# Patient Record
Sex: MALE | Race: WHITE | Employment: OTHER | URBAN - NONMETROPOLITAN AREA
[De-identification: names, ages, dates, MRNs, and addresses within clinical notes are randomized per-mention and may not be internally consistent; named-entity substitution may affect disease eponyms.]

---

## 2019-08-06 ENCOUNTER — ANESTHESIA EVENT (OUTPATIENT)
Dept: ENDOSCOPY | Age: 64
End: 2019-08-06
Payer: MEDICARE

## 2019-08-06 ENCOUNTER — ANESTHESIA (OUTPATIENT)
Dept: ENDOSCOPY | Age: 64
End: 2019-08-06
Payer: MEDICARE

## 2019-08-06 ENCOUNTER — HOSPITAL ENCOUNTER (OUTPATIENT)
Age: 64
Setting detail: OUTPATIENT SURGERY
Discharge: HOME | End: 2019-08-06
Attending: INTERNAL MEDICINE | Admitting: INTERNAL MEDICINE
Payer: MEDICARE

## 2019-08-06 VITALS
OXYGEN SATURATION: 100 % | WEIGHT: 171.6 LBS | BODY MASS INDEX: 23.24 KG/M2 | RESPIRATION RATE: 18 BRPM | TEMPERATURE: 97.6 F | HEIGHT: 72 IN | DIASTOLIC BLOOD PRESSURE: 73 MMHG | SYSTOLIC BLOOD PRESSURE: 133 MMHG | HEART RATE: 78 BPM

## 2019-08-06 VITALS
DIASTOLIC BLOOD PRESSURE: 56 MMHG | OXYGEN SATURATION: 99 % | SYSTOLIC BLOOD PRESSURE: 97 MMHG | RESPIRATION RATE: 9 BRPM

## 2019-08-06 PROCEDURE — 2580000003 HC RX 258: Performed by: INTERNAL MEDICINE

## 2019-08-06 PROCEDURE — 7100000000 HC PACU RECOVERY - FIRST 15 MIN: Performed by: INTERNAL MEDICINE

## 2019-08-06 PROCEDURE — 7100000001 HC PACU RECOVERY - ADDTL 15 MIN: Performed by: INTERNAL MEDICINE

## 2019-08-06 PROCEDURE — 3700000001 HC ADD 15 MINUTES (ANESTHESIA): Performed by: INTERNAL MEDICINE

## 2019-08-06 PROCEDURE — 2709999900 HC NON-CHARGEABLE SUPPLY: Performed by: INTERNAL MEDICINE

## 2019-08-06 PROCEDURE — 3700000000 HC ANESTHESIA ATTENDED CARE: Performed by: INTERNAL MEDICINE

## 2019-08-06 PROCEDURE — 3609009500 HC COLONOSCOPY DIAGNOSTIC OR SCREENING: Performed by: INTERNAL MEDICINE

## 2019-08-06 PROCEDURE — 6360000002 HC RX W HCPCS: Performed by: REGISTERED NURSE

## 2019-08-06 RX ORDER — M-VIT,TX,IRON,MINS/CALC/FOLIC 27MG-0.4MG
1 TABLET ORAL DAILY
COMMUNITY

## 2019-08-06 RX ORDER — SODIUM CHLORIDE 450 MG/100ML
INJECTION, SOLUTION INTRAVENOUS CONTINUOUS
Status: DISCONTINUED | OUTPATIENT
Start: 2019-08-06 | End: 2019-08-06 | Stop reason: HOSPADM

## 2019-08-06 RX ORDER — PIOGLITAZONE HCL AND METFORMIN HCL 850; 15 MG/1; MG/1
1 TABLET ORAL 2 TIMES DAILY WITH MEALS
COMMUNITY

## 2019-08-06 RX ORDER — CHLORAL HYDRATE 500 MG
CAPSULE ORAL
Status: ON HOLD | COMMUNITY
End: 2021-10-03

## 2019-08-06 RX ORDER — PROPOFOL 10 MG/ML
INJECTION, EMULSION INTRAVENOUS PRN
Status: DISCONTINUED | OUTPATIENT
Start: 2019-08-06 | End: 2019-08-06 | Stop reason: SDUPTHER

## 2019-08-06 RX ORDER — FENTANYL CITRATE 50 UG/ML
INJECTION, SOLUTION INTRAMUSCULAR; INTRAVENOUS PRN
Status: DISCONTINUED | OUTPATIENT
Start: 2019-08-06 | End: 2019-08-06 | Stop reason: SDUPTHER

## 2019-08-06 RX ORDER — AMLODIPINE AND OLMESARTAN MEDOXOMIL 10; 20 MG/1; MG/1
1 TABLET ORAL DAILY
COMMUNITY

## 2019-08-06 RX ORDER — AMPICILLIN TRIHYDRATE 250 MG
2 CAPSULE ORAL DAILY
COMMUNITY

## 2019-08-06 RX ADMIN — PROPOFOL 350 MG: 10 INJECTION, EMULSION INTRAVENOUS at 09:32

## 2019-08-06 RX ADMIN — SODIUM CHLORIDE: 4.5 INJECTION, SOLUTION INTRAVENOUS at 08:44

## 2019-08-06 RX ADMIN — FENTANYL CITRATE 100 MCG: 50 INJECTION INTRAMUSCULAR; INTRAVENOUS at 09:32

## 2019-08-06 ASSESSMENT — PAIN SCALES - GENERAL
PAINLEVEL_OUTOF10: 0
PAINLEVEL_OUTOF10: 0

## 2019-08-06 ASSESSMENT — PAIN - FUNCTIONAL ASSESSMENT: PAIN_FUNCTIONAL_ASSESSMENT: 0-10

## 2019-08-06 NOTE — OP NOTE
[address] [place] [address] Endoscopy    CONSCIOUS SEDATION      Patient: Singh Anglin  : 1955  Acct#: [de-identified]    PLANNED PROCEDURE   []EGD  [x]Colonoscopy []Flex Sigmoid  []Other:  Indication: Pain control for GI procedure    Consent: I have discussed with the patient and/or the patient representative the indication, alternatives, and the possible risks and/or complications of the planned procedure and the anesthesia methods. The patient and/or patient representative appear to understand and agree to proceed. Pre-Sedation Documentation and Exam: I have personally completed a history, physical exam & review of systems for this patient (see notes). Airway Assessment: mac    Prior History of Anesthesia Complications: mac    ASA Classification: mac    Sedation/ Anesthesia Plan: mac      SEDATION  Planned agent:[x]Midazolam []Meperidine [x]Sublimaze []Morphine    Monitoring and Safety: The patient will be placed on a cardiac monitor and vital signs, pulse oximetry and level of consciousness will be continuously evaluated throughout the procedure. The patient will be closely monitored until recovery from the medications is complete and the patient has returned to baseline status. Respiratory therapy will be on standby during the procedure. I have reviewed with the patient and/or family the risks, benefits, and alternatives to the procedure.     Stephanie Drummond MD, CNSP  2019, 9:28 AM    Post-Sedation Vital Signs: Vital signs were reviewed and were stable after the procedure (see flow sheet for vitals)            Post-Sedation Exam: Lungs: clear           Complications: none     Stephanie Drummond MD, ANIVALP  2019,

## 2019-08-06 NOTE — BRIEF OP NOTE
Brief Postoperative Note  ______________________________________________________________    Patient: Katie Correia  YOB: 1955  MRN: [id number]  Date of Procedure: 8/6/2019    Pre-Op Diagnosis: TUBULAR  ADENOMA, POOR PREP    Post-Op Diagnosis: Same       Procedure(s):  COLONOSCOPY DIAGNOSTIC    Anesthesia: Anesthesia type not filed in the log.     Surgeon(s):  Vi Vivar MD    Assistant: yes    Estimated Blood Loss (mL): less than 50     Complications: None    Specimens:   * No specimens in log *    Implants:  * No implants in log *      Drains: * No LDAs found *    Findings: normal colon    Vi Vivar MD  Date: 8/6/2019  Time: 9:52 AM

## 2019-08-06 NOTE — ANESTHESIA POSTPROCEDURE EVALUATION
Department of Anesthesiology  Postprocedure Note    Patient: Oswald Fonseca  MRN: [id number]  YOB: 1955  Date of evaluation: 8/6/2019  Time:  9:54 AM     Procedure Summary     Date:  08/06/19 Room / Location:  [facility] [place] / [facility]    Anesthesia Start:  7220 Anesthesia Stop:  6270    Procedure:  COLONOSCOPY DIAGNOSTIC (Left ) Diagnosis:  (TUBULAR  ADENOMA, POOR PREP)    Surgeon:  Rekha Cm MD Responsible Provider:  Alcira Day MD    Anesthesia Type:  MAC ASA Status:  2          Anesthesia Type: No value filed. Layo Phase I: Layo Score: 10    Layo Phase II:      Last vitals: Reviewed and per EMR flowsheets.        Anesthesia Post Evaluation    Patient location during evaluation: PACU  Patient participation: complete - patient participated  Level of consciousness: awake and alert  Pain score: 0  Airway patency: patent  Nausea & Vomiting: no vomiting and no nausea  Complications: no  Cardiovascular status: blood pressure returned to baseline  Respiratory status: acceptable and room air  Hydration status: euvolemic

## 2019-08-06 NOTE — ANESTHESIA PRE PROCEDURE
Cardiovascular:  Exercise tolerance: good (>4 METS),   (+) hypertension: moderate,                   Neuro/Psych:   Negative Neuro/Psych ROS              GI/Hepatic/Renal: Neg GI/Hepatic/Renal ROS  (+) bowel prep,           Endo/Other:    (+) DiabetesType II DM, well controlled, , .                 Abdominal:           Vascular: negative vascular ROS. Anesthesia Plan      MAC     ASA 2             Anesthetic plan and risks discussed with patient. Plan discussed with CRNA and attending.                   DEBBIE Mcgregor - NEO   8/6/2019

## 2021-10-02 ENCOUNTER — APPOINTMENT (OUTPATIENT)
Dept: CT IMAGING | Age: 66
DRG: 069 | End: 2021-10-02
Payer: MEDICARE

## 2021-10-02 ENCOUNTER — HOSPITAL ENCOUNTER (INPATIENT)
Age: 66
LOS: 1 days | Discharge: HOME | DRG: 069 | End: 2021-10-04
Attending: PEDIATRICS | Admitting: PEDIATRICS
Payer: MEDICARE

## 2021-10-02 DIAGNOSIS — H53.2 DIPLOPIA: Primary | ICD-10-CM

## 2021-10-02 DIAGNOSIS — G45.9 TIA (TRANSIENT ISCHEMIC ATTACK): ICD-10-CM

## 2021-10-02 LAB
ANION GAP SERPL CALCULATED.3IONS-SCNC: 17 MEQ/L (ref 8–16)
BASOPHILS # BLD: 0.7 %
BASOPHILS ABSOLUTE: 0 THOU/MM3 (ref 0–0.1)
BUN BLDV-MCNC: 9 MG/DL (ref 7–22)
CALCIUM SERPL-MCNC: 9.2 MG/DL (ref 8.5–10.5)
CHLORIDE BLD-SCNC: 98 MEQ/L (ref 98–111)
CO2: 22 MEQ/L (ref 23–33)
CREAT SERPL-MCNC: 0.6 MG/DL (ref 0.4–1.2)
EOSINOPHIL # BLD: 0.9 %
EOSINOPHILS ABSOLUTE: 0.1 THOU/MM3 (ref 0–0.4)
ERYTHROCYTE [DISTWIDTH] IN BLOOD BY AUTOMATED COUNT: 13.3 % (ref 11.5–14.5)
ERYTHROCYTE [DISTWIDTH] IN BLOOD BY AUTOMATED COUNT: 47.7 FL (ref 35–45)
GFR SERPL CREATININE-BSD FRML MDRD: > 90 ML/MIN/1.73M2
GLUCOSE BLD-MCNC: 134 MG/DL (ref 70–108)
HCT VFR BLD CALC: 43.9 % (ref 42–52)
HEMOGLOBIN: 15.1 GM/DL (ref 14–18)
IMMATURE GRANS (ABS): 0.03 THOU/MM3 (ref 0–0.07)
IMMATURE GRANULOCYTES: 0.5 %
LYMPHOCYTES # BLD: 27.6 %
LYMPHOCYTES ABSOLUTE: 1.5 THOU/MM3 (ref 1–4.8)
MCH RBC QN AUTO: 33.2 PG (ref 26–33)
MCHC RBC AUTO-ENTMCNC: 34.4 GM/DL (ref 32.2–35.5)
MCV RBC AUTO: 96.5 FL (ref 80–94)
MONOCYTES # BLD: 7.2 %
MONOCYTES ABSOLUTE: 0.4 THOU/MM3 (ref 0.4–1.3)
NUCLEATED RED BLOOD CELLS: 0 /100 WBC
OSMOLALITY CALCULATION: 274.5 MOSMOL/KG (ref 275–300)
PLATELET # BLD: 197 THOU/MM3 (ref 130–400)
PMV BLD AUTO: 9.5 FL (ref 9.4–12.4)
POTASSIUM SERPL-SCNC: 3.9 MEQ/L (ref 3.5–5.2)
RBC # BLD: 4.55 MILL/MM3 (ref 4.7–6.1)
SEG NEUTROPHILS: 63.1 %
SEGMENTED NEUTROPHILS ABSOLUTE COUNT: 3.5 THOU/MM3 (ref 1.8–7.7)
SODIUM BLD-SCNC: 137 MEQ/L (ref 135–145)
WBC # BLD: 5.6 THOU/MM3 (ref 4.8–10.8)

## 2021-10-02 PROCEDURE — 6360000004 HC RX CONTRAST MEDICATION: Performed by: NURSE PRACTITIONER

## 2021-10-02 PROCEDURE — 85025 COMPLETE CBC W/AUTO DIFF WBC: CPT

## 2021-10-02 PROCEDURE — 2580000003 HC RX 258: Performed by: NURSE PRACTITIONER

## 2021-10-02 PROCEDURE — 96374 THER/PROPH/DIAG INJ IV PUSH: CPT

## 2021-10-02 PROCEDURE — 70496 CT ANGIOGRAPHY HEAD: CPT

## 2021-10-02 PROCEDURE — 96361 HYDRATE IV INFUSION ADD-ON: CPT

## 2021-10-02 PROCEDURE — 70450 CT HEAD/BRAIN W/O DYE: CPT

## 2021-10-02 PROCEDURE — 99284 EMERGENCY DEPT VISIT MOD MDM: CPT

## 2021-10-02 PROCEDURE — 6360000002 HC RX W HCPCS: Performed by: NURSE PRACTITIONER

## 2021-10-02 PROCEDURE — 70498 CT ANGIOGRAPHY NECK: CPT

## 2021-10-02 PROCEDURE — 80048 BASIC METABOLIC PNL TOTAL CA: CPT

## 2021-10-02 PROCEDURE — 36415 COLL VENOUS BLD VENIPUNCTURE: CPT

## 2021-10-02 RX ORDER — ONDANSETRON 2 MG/ML
4 INJECTION INTRAMUSCULAR; INTRAVENOUS ONCE
Status: COMPLETED | OUTPATIENT
Start: 2021-10-02 | End: 2021-10-02

## 2021-10-02 RX ORDER — 0.9 % SODIUM CHLORIDE 0.9 %
1000 INTRAVENOUS SOLUTION INTRAVENOUS ONCE
Status: COMPLETED | OUTPATIENT
Start: 2021-10-02 | End: 2021-10-02

## 2021-10-02 RX ADMIN — SODIUM CHLORIDE 1000 ML: 9 INJECTION, SOLUTION INTRAVENOUS at 20:59

## 2021-10-02 RX ADMIN — ONDANSETRON 4 MG: 2 INJECTION INTRAMUSCULAR; INTRAVENOUS at 20:58

## 2021-10-02 RX ADMIN — IOPAMIDOL 80 ML: 755 INJECTION, SOLUTION INTRAVENOUS at 23:50

## 2021-10-02 ASSESSMENT — ENCOUNTER SYMPTOMS
COLOR CHANGE: 0
DIARRHEA: 0
ABDOMINAL PAIN: 0
ABDOMINAL DISTENTION: 0
SHORTNESS OF BREATH: 0
NAUSEA: 1
CHEST TIGHTNESS: 0
EYE PAIN: 0
VOMITING: 1
PHOTOPHOBIA: 0
EYE REDNESS: 0

## 2021-10-02 ASSESSMENT — VISUAL ACUITY
OD: 20/25
OU: 20/70
OS: 20/30

## 2021-10-02 NOTE — Clinical Note
Patient Class: Observation [104]   REQUIRED: Diagnosis: Acute CVA (cerebrovascular accident) [facility]) [[id number]]   Estimated Length of Stay: Estimated stay of less than 2 midnights   Admitting Provider: Frieda Kaiser [[id number]]

## 2021-10-03 ENCOUNTER — APPOINTMENT (OUTPATIENT)
Dept: MRI IMAGING | Age: 66
DRG: 069 | End: 2021-10-03
Payer: MEDICARE

## 2021-10-03 PROBLEM — I63.9 ACUTE CVA (CEREBROVASCULAR ACCIDENT) (HCC): Status: ACTIVE | Noted: 2021-10-03

## 2021-10-03 PROBLEM — G45.9 TIA (TRANSIENT ISCHEMIC ATTACK): Status: ACTIVE | Noted: 2021-10-03

## 2021-10-03 LAB
AVERAGE GLUCOSE: 126 MG/DL (ref 70–126)
CHOLESTEROL, TOTAL: 151 MG/DL (ref 100–199)
GLUCOSE BLD-MCNC: 114 MG/DL (ref 70–108)
GLUCOSE BLD-MCNC: 119 MG/DL (ref 70–108)
GLUCOSE BLD-MCNC: 123 MG/DL (ref 70–108)
GLUCOSE BLD-MCNC: 153 MG/DL (ref 70–108)
HBA1C MFR BLD: 6.2 % (ref 4.4–6.4)
HDLC SERPL-MCNC: 60 MG/DL
LDL CHOLESTEROL CALCULATED: 71 MG/DL
MRSA SCREEN RT-PCR: NEGATIVE
TRIGL SERPL-MCNC: 100 MG/DL (ref 0–199)
VANCOMYCIN RESISTANT ENTEROCOCCUS: NEGATIVE

## 2021-10-03 PROCEDURE — 36415 COLL VENOUS BLD VENIPUNCTURE: CPT

## 2021-10-03 PROCEDURE — 6360000004 HC RX CONTRAST MEDICATION: Performed by: PEDIATRICS

## 2021-10-03 PROCEDURE — 99223 1ST HOSP IP/OBS HIGH 75: CPT | Performed by: PEDIATRICS

## 2021-10-03 PROCEDURE — 1200000000 HC SEMI PRIVATE

## 2021-10-03 PROCEDURE — 80061 LIPID PANEL: CPT

## 2021-10-03 PROCEDURE — 6370000000 HC RX 637 (ALT 250 FOR IP): Performed by: PEDIATRICS

## 2021-10-03 PROCEDURE — 87641 MR-STAPH DNA AMP PROBE: CPT

## 2021-10-03 PROCEDURE — G0378 HOSPITAL OBSERVATION PER HR: HCPCS

## 2021-10-03 PROCEDURE — 82948 REAGENT STRIP/BLOOD GLUCOSE: CPT

## 2021-10-03 PROCEDURE — 2580000003 HC RX 258: Performed by: PEDIATRICS

## 2021-10-03 PROCEDURE — A9579 GAD-BASE MR CONTRAST NOS,1ML: HCPCS | Performed by: PEDIATRICS

## 2021-10-03 PROCEDURE — 6360000002 HC RX W HCPCS: Performed by: PEDIATRICS

## 2021-10-03 PROCEDURE — 87081 CULTURE SCREEN ONLY: CPT

## 2021-10-03 PROCEDURE — 70553 MRI BRAIN STEM W/O & W/DYE: CPT

## 2021-10-03 PROCEDURE — 96372 THER/PROPH/DIAG INJ SC/IM: CPT

## 2021-10-03 PROCEDURE — 83036 HEMOGLOBIN GLYCOSYLATED A1C: CPT

## 2021-10-03 PROCEDURE — 99223 1ST HOSP IP/OBS HIGH 75: CPT | Performed by: PHYSICIAN ASSISTANT

## 2021-10-03 PROCEDURE — 87500 VANOMYCIN DNA AMP PROBE: CPT

## 2021-10-03 RX ORDER — SODIUM CHLORIDE 0.9 % (FLUSH) 0.9 %
10 SYRINGE (ML) INJECTION EVERY 12 HOURS SCHEDULED
Status: DISCONTINUED | OUTPATIENT
Start: 2021-10-03 | End: 2021-10-04 | Stop reason: HOSPADM

## 2021-10-03 RX ORDER — SODIUM CHLORIDE 0.9 % (FLUSH) 0.9 %
10 SYRINGE (ML) INJECTION PRN
Status: DISCONTINUED | OUTPATIENT
Start: 2021-10-03 | End: 2021-10-04 | Stop reason: HOSPADM

## 2021-10-03 RX ORDER — DEXTROSE MONOHYDRATE 25 G/50ML
12.5 INJECTION, SOLUTION INTRAVENOUS PRN
Status: DISCONTINUED | OUTPATIENT
Start: 2021-10-03 | End: 2021-10-04 | Stop reason: HOSPADM

## 2021-10-03 RX ORDER — ONDANSETRON 4 MG/1
4 TABLET, ORALLY DISINTEGRATING ORAL EVERY 6 HOURS PRN
COMMUNITY

## 2021-10-03 RX ORDER — SODIUM CHLORIDE 9 MG/ML
25 INJECTION, SOLUTION INTRAVENOUS PRN
Status: DISCONTINUED | OUTPATIENT
Start: 2021-10-03 | End: 2021-10-04 | Stop reason: HOSPADM

## 2021-10-03 RX ORDER — ACETAMINOPHEN 325 MG/1
650 TABLET ORAL EVERY 6 HOURS PRN
Status: DISCONTINUED | OUTPATIENT
Start: 2021-10-03 | End: 2021-10-04 | Stop reason: HOSPADM

## 2021-10-03 RX ORDER — DEXTROSE MONOHYDRATE 50 MG/ML
100 INJECTION, SOLUTION INTRAVENOUS PRN
Status: DISCONTINUED | OUTPATIENT
Start: 2021-10-03 | End: 2021-10-04 | Stop reason: HOSPADM

## 2021-10-03 RX ORDER — SODIUM CHLORIDE 9 MG/ML
INJECTION, SOLUTION INTRAVENOUS CONTINUOUS
Status: DISCONTINUED | OUTPATIENT
Start: 2021-10-03 | End: 2021-10-04

## 2021-10-03 RX ORDER — ONDANSETRON 2 MG/ML
4 INJECTION INTRAMUSCULAR; INTRAVENOUS EVERY 6 HOURS PRN
Status: DISCONTINUED | OUTPATIENT
Start: 2021-10-03 | End: 2021-10-04 | Stop reason: HOSPADM

## 2021-10-03 RX ORDER — MAGNESIUM HYDROXIDE/ALUMINUM HYDROXICE/SIMETHICONE 120; 1200; 1200 MG/30ML; MG/30ML; MG/30ML
30 SUSPENSION ORAL EVERY 6 HOURS PRN
Status: DISCONTINUED | OUTPATIENT
Start: 2021-10-03 | End: 2021-10-04 | Stop reason: HOSPADM

## 2021-10-03 RX ORDER — ONDANSETRON 4 MG/1
4 TABLET, ORALLY DISINTEGRATING ORAL EVERY 8 HOURS PRN
Status: DISCONTINUED | OUTPATIENT
Start: 2021-10-03 | End: 2021-10-04 | Stop reason: HOSPADM

## 2021-10-03 RX ORDER — ATORVASTATIN CALCIUM 80 MG/1
80 TABLET, FILM COATED ORAL NIGHTLY
Status: DISCONTINUED | OUTPATIENT
Start: 2021-10-03 | End: 2021-10-04 | Stop reason: HOSPADM

## 2021-10-03 RX ORDER — POLYETHYLENE GLYCOL 3350 17 G/17G
17 POWDER, FOR SOLUTION ORAL DAILY PRN
Status: DISCONTINUED | OUTPATIENT
Start: 2021-10-03 | End: 2021-10-04 | Stop reason: HOSPADM

## 2021-10-03 RX ORDER — NICOTINE POLACRILEX 4 MG
15 LOZENGE BUCCAL PRN
Status: DISCONTINUED | OUTPATIENT
Start: 2021-10-03 | End: 2021-10-04 | Stop reason: HOSPADM

## 2021-10-03 RX ORDER — AMLODIPINE AND OLMESARTAN MEDOXOMIL 10; 20 MG/1; MG/1
1 TABLET ORAL DAILY
Status: DISCONTINUED | OUTPATIENT
Start: 2021-10-03 | End: 2021-10-03

## 2021-10-03 RX ORDER — ACETAMINOPHEN 650 MG/1
650 SUPPOSITORY RECTAL EVERY 6 HOURS PRN
Status: DISCONTINUED | OUTPATIENT
Start: 2021-10-03 | End: 2021-10-04 | Stop reason: HOSPADM

## 2021-10-03 RX ORDER — LANOLIN ALCOHOL/MO/W.PET/CERES
500 CREAM (GRAM) TOPICAL DAILY
Status: DISCONTINUED | OUTPATIENT
Start: 2021-10-03 | End: 2021-10-04 | Stop reason: HOSPADM

## 2021-10-03 RX ORDER — CHOLECALCIFEROL (VITAMIN D3) 50 MCG
1 TABLET ORAL DAILY
COMMUNITY

## 2021-10-03 RX ORDER — AMLODIPINE BESYLATE 10 MG/1
10 TABLET ORAL DAILY
Status: DISCONTINUED | OUTPATIENT
Start: 2021-10-03 | End: 2021-10-04 | Stop reason: HOSPADM

## 2021-10-03 RX ORDER — LOSARTAN POTASSIUM 50 MG/1
50 TABLET ORAL DAILY
Status: DISCONTINUED | OUTPATIENT
Start: 2021-10-03 | End: 2021-10-04 | Stop reason: HOSPADM

## 2021-10-03 RX ADMIN — INSULIN LISPRO 1 UNITS: 100 INJECTION, SOLUTION INTRAVENOUS; SUBCUTANEOUS at 18:15

## 2021-10-03 RX ADMIN — ASPIRIN 325 MG: 325 TABLET, COATED ORAL at 12:00

## 2021-10-03 RX ADMIN — SODIUM CHLORIDE: 9 INJECTION, SOLUTION INTRAVENOUS at 05:44

## 2021-10-03 RX ADMIN — AMLODIPINE BESYLATE 10 MG: 10 TABLET ORAL at 12:00

## 2021-10-03 RX ADMIN — SODIUM CHLORIDE, PRESERVATIVE FREE 10 ML: 5 INJECTION INTRAVENOUS at 13:11

## 2021-10-03 RX ADMIN — GADOTERIDOL 15 ML: 279.3 INJECTION, SOLUTION INTRAVENOUS at 11:27

## 2021-10-03 RX ADMIN — ENOXAPARIN SODIUM 40 MG: 40 INJECTION SUBCUTANEOUS at 13:10

## 2021-10-03 ASSESSMENT — PAIN SCALES - GENERAL
PAINLEVEL_OUTOF10: 0
PAINLEVEL_OUTOF10: 0

## 2021-10-03 NOTE — PROGRESS NOTES
Patient admitted into 4K01. Patient A&Ox4. Continues to have complaints of left eye double vision. Denies pain. Patient oriented to room. 2 person skin assessment completed with Puja Vickers RN. Abrasion  Noted to left elbow. Pt has dry skin on right side of face.

## 2021-10-03 NOTE — ED NOTES
Pt appears to be resting on cot with eyes closed. No distress noted. Respirations even and unlabored. Call light is in place. Will monitor.       Saige Vaughn RN  10/03/21 0974

## 2021-10-03 NOTE — ED NOTES
PT reports nausea better. Denies needs at this time.   No s/sx of distress     Francisco Bunn RN  10/02/21 3673

## 2021-10-03 NOTE — ED NOTES
Pt transported to [facility] cart in stable condition. Floor contacted before transport. Spoke with Dave Holbrook.      Meri Mouse  10/03/21 4945

## 2021-10-03 NOTE — ED NOTES
Bed: 038A  Expected date:   Expected time:   Means of arrival:   Comments:  HEENA Wesley RN  10/03/21 1047

## 2021-10-03 NOTE — ED NOTES
Pt back on cot. Pt denies pain at this time. Respirations even and unlabored. Call light in place.  Pt denies any need or concerns at this time     Saige Vaughn RN  10/03/21 5970

## 2021-10-03 NOTE — ED TRIAGE NOTES
PT states for 1.5 days I've been having blurred & double vision in both eyes. Denies headache. PT ambulated from triage to ER room.

## 2021-10-03 NOTE — ED NOTES
Pt to MRI at this time.  Will be returning to room 38 after MRI     Francisco Barron RN  10/03/21 1046

## 2021-10-03 NOTE — ED NOTES
ED nurse-to-nurse bedside report    Chief Complaint   Patient presents with    Blurred Vision      LOC: alert and orientated to name, place, date  Vital signs   Vitals:    10/02/21 2033 10/02/21 2202   BP: (!) 156/81 133/71   Pulse: 95 69   Resp: 19 18   Temp: 98.1 °F (36.7 °C)    TempSrc: Oral    SpO2: 99% 96%      Pain:    Pain Interventions: none  Pain Goal: none  Oxygen: No    Current needs required none   Telemetry: No  LDAs:   Peripheral IV 10/02/21 Right Hand (Active)   Site Assessment Clean;Dry; Intact 10/02/21 2048   Line Status Blood return noted;Capped;Flushed 10/02/21 2048   Dressing Status Clean;Dry; Intact 10/02/21 2048   Dressing Intervention New 10/02/21 2048     Continuous Infusions:   Mobility: Requires assistance * 1  Savage Fall Risk Score: No flowsheet data found.   Fall Interventions: none  Report given to: 30 Naseem Wheeler RN  10/02/21 7113

## 2021-10-03 NOTE — ED NOTES
Pt up to restroom. Pt states he is still experiencing \"some\" burry vision, but it is \"better\" than before. Pt able to walk to restroom without any complications.       Jason Vu RN  10/03/21 0795

## 2021-10-03 NOTE — ED NOTES
ED to inpatient nurses report    Chief Complaint   Patient presents with    Blurred Vision      Present to ED from home  LOC: alert and orientated to name, place, date  Vital signs   Vitals:    10/03/21 0917 10/03/21 1032 10/03/21 1257 10/03/21 1456   BP: (!) 144/86 138/79 (!) 138/46 136/76   Pulse: 77 86 82 78   Resp: 16 16 16 16   Temp:       TempSrc:       SpO2: 96% 96% 97% 96%   Weight:       Height:          Oxygen Baseline Room Air    Current needs required Room Air  LDAs:   Peripheral IV 10/02/21 Right Hand (Active)   Site Assessment Clean;Dry; Intact 10/03/21 1033   Line Status Flushed 10/03/21 1033   Dressing Status Clean;Dry; Intact 10/03/21 1033   Dressing Intervention New 10/02/21 2048       Peripheral IV 10/02/21 Right Upper arm (Active)   Site Assessment Clean;Dry; Intact 10/03/21 1033   Line Status Infusing 10/03/21 1033   Dressing Status Clean;Dry; Intact 10/03/21 1033   Dressing Intervention New 10/02/21 2336     Mobility: Independent  Pending ED orders: None  Present condition: Stable    Electronically signed by Omero Candelario RN on 10/3/2021 at 3:52 PM       Omero Candelario RN  10/03/21 1318

## 2021-10-03 NOTE — ED PROVIDER NOTES
[facility] Emergency Department    CHIEF COMPLAINT       Chief Complaint   Patient presents with    Blurred Vision       Nurses Notes reviewed and I agree except as noted in the HPI. HISTORY OF PRESENT ILLNESS    Agustin Sharma is a 77 y.o. male who presents to the ED for evaluation of double vision. Patient notes yesterday around noon he was welding some galvanized metal, afterwards he developed severe nausea and vomiting. He then noticed that he is having double vision, he notes it has only with both eyes open. Its worse when looking at things at a distance or to his left hand side. He notes unsteadiness in gait. He has noted continued nausea and vomiting throughout the night and today. He has a history of diabetes and hypertension, he notes no significant elevation in blood sugars. He denies any similar symptoms in the past other than nausea in the past.      HPI was provided by the patient. REVIEW OF SYSTEMS     Review of Systems   Constitutional: Negative for activity change, chills and fever. Eyes: Positive for visual disturbance. Negative for photophobia, pain and redness. Respiratory: Negative for chest tightness and shortness of breath. Cardiovascular: Negative for chest pain. Gastrointestinal: Positive for nausea and vomiting. Negative for abdominal distention, abdominal pain and diarrhea. Genitourinary: Negative for decreased urine volume and difficulty urinating. Musculoskeletal: Positive for gait problem. Negative for neck pain and neck stiffness. Skin: Negative for color change. Neurological: Negative for dizziness, facial asymmetry, speech difficulty, weakness, light-headedness, numbness and headaches. Psychiatric/Behavioral: Negative for agitation and behavioral problems.         PAST MEDICAL HISTORY     Past Medical History:   Diagnosis Date    Diabetes mellitus ([address]     History of blood transfusion     Hypertension        SURGICALHISTORY      has a past surgical history that includes hernia repair; Foot surgery (Left); and Colonoscopy (Left, 8/6/2019). CURRENT MEDICATIONS       Previous Medications    AMLODIPINE-OLMESARTAN (DANIEL) 10-20 MG PER TABLET    Take 1 tablet by mouth daily    ASPIRIN 81 MG TABLET    Take 81 mg by mouth daily    CINNAMON 500 MG CAPS    Take by mouth    CYANOCOBALAMIN (VITAMIN B 12 PO)    Take 500 mg by mouth daily    MULTIPLE VITAMINS-MINERALS (THERAPEUTIC MULTIVITAMIN-MINERALS) TABLET    Take 1 tablet by mouth daily    OMEGA-3 FATTY ACIDS (OMEGA-3 FISH OIL) 1000 MG CAPS    Take by mouth    PIOGLITAZONE-METFORMIN (ACTOPLUS MET)  MG PER TABLET    Take 1 tablet by mouth 2 times daily (with meals)       ALLERGIES     has No Known Allergies. FAMILY HISTORY     has no family status information on file. family history is not on file. SOCIAL HISTORY       Social History     Socioeconomic History    Marital status:      Spouse name: Not on file    Number of children: Not on file    Years of education: Not on file    Highest education level: Not on file   Occupational History    Not on file   Tobacco Use    Smoking status: Never Smoker    Smokeless tobacco: Never Used   Substance and Sexual Activity    Alcohol use: Yes     Comment: daily 2 cans    Drug use: Never    Sexual activity: Not on file   Other Topics Concern    Not on file   Social History Narrative    Not on file     Social Determinants of Health     Financial Resource Strain:     Difficulty of Paying Living Expenses:    Food Insecurity:     Worried About Running Out of Food in the Last Year:     [address] in the Last Year:    Transportation Needs:     Lack of Transportation (Medical):      Lack of Transportation (Non-Medical):    Physical Activity:     Days of Exercise per Week:     Minutes of Exercise per Session:    Stress:     Feeling of Stress :    Social Connections:     Frequency of Communication with Friends and Family:     Frequency of Social Gatherings with Friends and Family:     Attends Islam Services:     Active Member of Clubs or Organizations:     Attends Club or Organization Meetings:     Marital Status:    Intimate Partner Violence:     Fear of Current or Ex-Partner:     Emotionally Abused:     Physically Abused:     Sexually Abused:        PHYSICAL EXAM     INITIAL VITALS:  oral temperature is 98.1 °F (36.7 °C). His blood pressure is 122/72 and his pulse is 69. His respiration is 18 and oxygen saturation is 94%. Physical Exam  Vitals and nursing note reviewed. Constitutional:       General: He is not in acute distress. Appearance: Normal appearance. He is normal weight. He is not toxic-appearing. HENT:      Head: Normocephalic and atraumatic. Nose: Nose normal.      Mouth/Throat:      Mouth: Mucous membranes are moist.   Eyes:      General: Lids are normal. No visual field deficit. Extraocular Movements: Extraocular movements intact. Right eye: Normal extraocular motion and no nystagmus. Left eye: Normal extraocular motion and no nystagmus. Conjunctiva/sclera: Conjunctivae normal.      Right eye: Right conjunctiva is not injected. Left eye: Left conjunctiva is not injected. Pupils: Pupils are equal, round, and reactive to light. Visual Fields: Right eye visual fields normal and left eye visual fields normal.   Cardiovascular:      Rate and Rhythm: Normal rate. Pulses: Normal pulses. Pulmonary:      Effort: Pulmonary effort is normal.   Abdominal:      General: Abdomen is flat. Palpations: Abdomen is soft. Musculoskeletal:         General: Normal range of motion. Cervical back: Normal range of motion. Skin:     General: Skin is warm. Capillary Refill: Capillary refill takes less than 2 seconds. Neurological:      Mental Status: He is alert and oriented to person, place, and time. GCS: GCS eye subscore is 4. GCS verbal subscore is 5. dose modulation techniques and iterative    reconstructions, and/or weight-based dosing   when appropriate to reduce radiation to a low as reasonably achievable. Carotid stenosis and measurements are in accordance with NASCET criteria. LABS:   Labs Reviewed   CBC WITH AUTO DIFFERENTIAL - Abnormal; Notable for the following components:       Result Value    RBC 4.55 (*)     MCV 96.5 (*)     MCH 33.2 (*)     RDW-SD 47.7 (*)     All other components within normal limits   BASIC METABOLIC PANEL - Abnormal; Notable for the following components:    CO2 22 (*)     Glucose 134 (*)     All other components within normal limits   ANION GAP - Abnormal; Notable for the following components:    Anion Gap 17.0 (*)     All other components within normal limits   OSMOLALITY - Abnormal; Notable for the following components:    Osmolality Calc 274.5 (*)     All other components within normal limits   GLOMERULAR FILTRATION RATE, ESTIMATED       EMERGENCY DEPARTMENT COURSE:   Vitals:    Vitals:    10/03/21 0032 10/03/21 0041 10/03/21 0140 10/03/21 0348   BP: (!) 144/74   122/72   Pulse: 67 73 74 69   Resp: 14 18 18 18   Temp:       TempSrc:       SpO2: 96% 95% 95% 94%       MDM    Patient was seen and evaluated in the emergency department, patient appeared to be in no acute distress, vital signs reviewed, slight hypertension noted. Physical exam was completed, no significant abnormalities noted. Cranial nerves are grossly intact, pupils are equal react to light, no abnormal to extraocular movements. Noted worsening double vision with leftward view. Discussed the case with on-call neurology, they suggested CT with CTA of the head neck. Labs and imaging were ordered. Results were reviewed, no significant findings noted. Patient was reassessed, continues to be having diplopia, notes improvement in nausea after medication. Discussed the case again with neurology, they suggest admission for MRI.   I discussed this

## 2021-10-03 NOTE — CONSULTS
[facility] Consult Note          Name: Evelia Jeronimo  MRN: [id number]  : 1955  Date: 10/3/2021    Requesting Physician: Jenny Shearer MD     Reason for Consult:  Evaluate for diplopia     Chief Complaint: double vision, balance trouble     History of Present Illness: This is a 77, M, w/ PMHx significant for HTN, DM, who reports acute onset double vision & balance issues w/ associated nausea that first appeared 2 days ago. The patient describes his double vision as images stacked on top of one another, experiencing resolution w/ eye closure. Concerning the patient's balance issues, he says that his symptoms, which are characterized as being pulled to both the left and right, are exacerbated by head turning, improved w/ laying down. The patient denies any preceding illness or relevant trigger. There has been no associated speech disturbance, focal weakness, or numbness. SHx review shows that he's a never smoker. ETOH w/ 2 beers nightly. No illicit drug use. No new meds. FHx positive for mother w/ MS diagnosis. Patient reports improvement of his symptoms throughout the ER course w/ the administration of fluids & Zofran     Past Medical History:        Diagnosis Date    Diabetes mellitus ([facility] [address]     History of blood transfusion     Hypertension            Procedure Laterality Date    COLONOSCOPY Left 2019    COLONOSCOPY DIAGNOSTIC performed by Mone Johnosn [facility] at [facility] Left     HERNIA REPAIR         Social History:  Social History     Tobacco Use   Smoking Status Never Smoker   Smokeless Tobacco Never Used     Social History     Substance and Sexual Activity   Alcohol Use Yes    Comment: daily 2 cans     Social History     Substance and Sexual Activity   Drug Use Never         Family History:   History reviewed. No pertinent family history. Review of Systems:  Neuro: Positive for diplopia when gaze to the L.  Negative for vision loss, weakness, sensation loss  Cardio: Denies chest pain, palpitations  Lungs: Denies cough, dyspnea    GI: Positive for nausea, denies vomiting    - All other systems reviewed & are negative unless mentioned in HPI      Allergies:    No Known Allergies     Current Medications:   vitamin B-12 (CYANOCOBALAMIN) tablet 500 mcg, Daily  sodium chloride flush 0.9 % injection 10 mL, 2 times per day  sodium chloride flush 0.9 % injection 10 mL, PRN  0.9 % sodium chloride infusion, PRN  enoxaparin (LOVENOX) injection 40 mg, Daily  ondansetron (ZOFRAN-ODT) disintegrating tablet 4 mg, Q8H PRN   Or  ondansetron (ZOFRAN) injection 4 mg, Q6H PRN  polyethylene glycol (GLYCOLAX) packet 17 g, Daily PRN  acetaminophen (TYLENOL) tablet 650 mg, Q6H PRN   Or  acetaminophen (TYLENOL) suppository 650 mg, Q6H PRN  aluminum & magnesium hydroxide-simethicone (MAALOX) 200-200-20 MG/5ML suspension 30 mL, Q6H PRN  glucose (GLUTOSE) 40 % oral gel 15 g, PRN  dextrose 50 % IV solution, PRN  glucagon (rDNA) injection 1 mg, PRN  dextrose 5 % solution, PRN  insulin lispro (HUMALOG) injection vial 0-6 Units, TID WC  insulin lispro (HUMALOG) injection vial 0-3 Units, Nightly  perflutren lipid microspheres (DEFINITY) injection 1.65 mg, ONCE PRN  0.9 % sodium chloride infusion, Continuous  aspirin EC tablet 325 mg, Daily  amLODIPine (NORVASC) tablet 10 mg, Daily   Or  losartan (COZAAR) tablet 50 mg, Daily         Physical Exam:  /71   Pulse 65   Temp 98.1 °F (36.7 °C) (Oral)   Resp 18   Ht 6' (1.829 m)   Wt 178 lb (80.7 kg)   SpO2 95%   BMI 24.14 kg/m²  I Body mass index is 24.14 kg/m².  I   Wt Readings from Last 1 Encounters:   10/03/21 178 lb (80.7 kg)          Cognition:   Level of Alertness: awake, alert   Orientation: a/o x3 (person, place, time)  Fund of Knowledge: appears intact  Attention/Concentration: normal  Cranial Nerves: cranial nerves II-XII are grossly intact  Language: Repetition, reading, naming intact  Dysarthria: None  Commands: patient able to follow commands crossing the midline      Motor Exam:  RUE: 5/5   RLE: 5/5   LUE: 5/5   LLE: 5/5    Muscle Bulk: no muscle waisting, bulk appropriate for age  Muscle Tone: normal    Abnormal movements: none    DTR:   BL brachioradialis: 2+   BL patellar: 2+    Sensory: Sensory intact with light touch throughout   Coordination: FNF intact, HTS intact  Gait: deferred d/t patient status       General appearance: NAD, conversant  Eyes: anicteric sclerae, moist   HENT: Atraumatic; oropharynx clear with moist mucous membranes  Neck: Trachea midline; FROM  Lungs: symmetrical chest rise, no assesscory muscle use  CV: skin appears well perfused, 2+ pulses at the radial BL   Abdomen: Soft, non-tender  Extremities: no deformities, no swelling   Skin: Normal temperature, turgor  Psych: Appropriate affect, pleasant       Diagnostics:  CBC:   Lab Results   Component Value Date    WBC 5.6 10/02/2021    RBC 4.55 10/02/2021    RBC 4.40 09/25/2018    HGB 15.1 10/02/2021    HCT 43.9 10/02/2021    MCV 96.5 10/02/2021    MCH 33.2 10/02/2021    MCHC 34.4 10/02/2021    RDW 12.4 09/25/2018     10/02/2021    MPV 9.5 10/02/2021     CMP:    Lab Results   Component Value Date     10/02/2021    K 3.9 10/02/2021    CL 98 10/02/2021    CO2 22 10/02/2021    BUN 9 10/02/2021    CREATININE 0.6 10/02/2021    LABGLOM >90 10/02/2021    GLUCOSE 134 10/02/2021    GLUCOSE 155 03/29/2019    PROT 7.5 03/29/2019    LABALBU 4.5 03/29/2019    CALCIUM 9.2 10/02/2021    BILITOT 0.4 03/29/2019    ALKPHOS 72 03/29/2019    AST 17 03/29/2019    ALT 15 03/29/2019     Imaging:     CT HEAD WO CONTRAST 10/3/21  Impression   No acute intracranial findings. Nonemergent/incidental findings in the report.         CTA HEAD W WO CONTRAST 10/3/21   Impression   No intracranial large artery occlusion or hemodynamically significant    stenosis.      CTA NECK W WO CONTRAST  Impression   No occlusion in the carotid or vertebral arteries. No dissection. Moderate stenosis of the V1 segment of the left vertebral artery. MRI BRAIN W WO CONTRAST  Impression       1. No evidence of acute intracranial abnormality. 2. Mild severity chronic microvascular angiopathy. 3. Small chronic lacunar infarct in the right basal ganglia. Assessment & Recommendations:    1. Diplopia & balance disturbance in the setting of negative neuro images and known DM point to etiology other than stroke or vascular cause. Diplopia possibly related to 4th nerve palsy too subtle to detect on physical exam in the setting of DM.  Balance disturbance likely an acute peripheral vestibulopathy.    - CT & MR studies personally reviewed   - Continue w/ Zofran as needed   - Consider short-term use --> prolonged use may interfere w/ central vestibular compensation & delay recovery   - Maintain supportive therapy including fluids & rest  - Recommend vestibular rehabilitation therapy  - Neurology will sign-off, please call w/ questions      Discussed w/ Dr. Maria Luz Cagle PA-C  Interventional Neurology

## 2021-10-03 NOTE — ED NOTES
Pt appears to be resting comfortably on cot. MRI form completed. Pt denies pain. Wife at bedside.  Pt denies any needs or concerns at this time     Shannon Hernandez RN  10/03/21 7205

## 2021-10-03 NOTE — H&P
Hospitalist History and Physical          Patient: Becca Hinson  : 1955  MRN: [id number]     Acct: [de-identified]    PCP: Natalie Corado MD  Date of Admission: 10/2/2021  Date of Service: Pt seen/examined on 10/03/21  and Admitted to inpatient with expected LOS more than two midnights due to medical therapy. Hospital Problems         Last Modified POA    Acute CVA (cerebrovascular accident) ([address] 10/3/2021 Yes          Assessment and Plan:    1. Suspected acute CVA:  Persistent diplopia and imbalance concerning for a posterior circulation region. Admit to the hospital for further work-up with MRI and echocardiogram.  Full dose aspirin 325 mg daily. Permissive hypertension up to a systolic blood pressure of 175 mmHg. Hold Norvasc for now since patient's blood pressure normal now. Neurology consultation. Neurochecks every 4 hours. A1c and lipid profile for further risk stratification. Antiplatelet therapy, blood sugar and blood pressure control after discharge for risk modification and secondary prevention. PT/OT evaluation and treatment. Speech therapy evaluation and treatment. Keep on dysphagia diet for now. 2.  Diabetes mellitus type 2:  Last hemoglobin A1c on 3/2019 was 6.5. Repeat hemoglobin A1c. Accu-Cheks q. CHS and diabetic diet. Hold oral antihyperglycemic's Metformin and pioglitazone. Low-dose insulin sliding scale coverage. 3.  Hypertension:  Again allowing for permissive hypertension. Hold Norvasc for now. IV fluids hydration for permissive hypertension. 4.  DVT prophylaxis:  Subcu Lovenox. 5.  Fluid/electrolyte/nutrition:  Normal saline at 75 cc an hour. Electrolytes within normal limits. Diabetic dysphagia diet.      =======================================================================      Chief Complaint: Diplopia and imbalance.     History Of Present Illness:  Becca Hinson is a 77 y.o. male with PMHx of hypertension, hyperlipidemia, type 2 diabetes mellitus who presents to the emergency room here at [facility] on 10/2/2021 complaining of about a 36-hour history of new onset diplopia which occurred after he ate breakfast on Friday, 10/1/2021 accompanied by an imbalance in his gait. Patient reported that he was in his normal state of health with no signs or symptoms of any type of infection or viral illness when he suddenly started having double vision of objects far away accompanied by an imbalance or ataxia having to use furniture to hold himself from falling. He continued to have nausea and was unable to eat anything or keep anything down including his medications. The following day, which is today 10/2/2021, he decided to come in for a checkup. He denies having any symptoms of numbness or tingling or focal weakness in any of his limbs. No slurred speech, drooling, or aphasia or difficulty finding words or talking. No asymmetrical facial droop or smile. No prior history of strokes. He does have multiple risk factors including diabetes, hypertension, and hyperlipidemia. Denies any history of hypoglycemia as well. He states that he has not had any GI symptoms of diarrhea or any viral syndrome prior to the onset of his symptoms. Upon arrival to the emergency room patient's blood pressure was 156/81 with a pulse of 95 and respiratory of 19 and he was afebrile. His laboratory investigations were unremarkable CT scan of the head without contrast did not show any acute intracranial process. CT angiogram of the head and neck did show moderate narrowing of the V1 section of the left vertebral artery. Patient was given 1 L of IV fluid normal saline and 4 mg of IV Zofran. Neurology service was contacted from the emergency room and recommended admission for an MRI. Patient was then admitted to our hospitalist service for further evaluation and treatment.     At the time of my evaluation in the emergency room, the patient was laying in bed resting comfortably no acute distress. He continued to have diplopia when looking at objects from a distance. He says that his ataxia has been present while in the emergency room. Otherwise the rest of his neurological exam was normal.      Past Medical History:        Diagnosis Date    Diabetes mellitus ([address]     History of blood transfusion     Hypertension        Past Surgical History:        Procedure Laterality Date    COLONOSCOPY Left 8/6/2019    COLONOSCOPY DIAGNOSTIC performed by Joey Javier [facility] at [facility] Left     HERNIA REPAIR         Medications Prior to Admission:   Prior to Admission medications    Medication Sig Start Date End Date Taking? Authorizing Provider   Cyanocobalamin (VITAMIN B 12 PO) Take 500 mg by mouth daily   Yes Historical Provider, MD   Multiple Vitamins-Minerals (THERAPEUTIC MULTIVITAMIN-MINERALS) tablet Take 1 tablet by mouth daily   Yes Historical Provider, MD   aspirin 81 MG tablet Take 81 mg by mouth daily   Yes Historical Provider, MD   pioglitazone-metformin (ACTOPLUS MET)  MG per tablet Take 1 tablet by mouth 2 times daily (with meals)   Yes Historical Provider, MD   Cinnamon 500 MG CAPS Take by mouth   Yes Historical Provider, MD   amLODIPine-olmesartan (DANIEL) 10-20 MG per tablet Take 1 tablet by mouth daily   Yes Historical Provider, MD   Omega-3 Fatty Acids (OMEGA-3 FISH OIL) 1000 MG CAPS Take by mouth   Yes Historical Provider, MD       Allergies:  Patient has no known allergies. Social History:    The patient currently lives by himself and is pretty independent and capable of carrying all activities of daily living on his own. .   Tobacco use:   reports that he has never smoked. He has never used smokeless tobacco.  Alcohol use:   reports current alcohol use. Drug use:  reports no history of drug use. Family History:   as follows:  History reviewed. No pertinent family history.     Review of Systems:   Pertinent positives and negatives as noted in the HPI. All other systems reviewed and negative. Physical Exam:    /72   Pulse 69   Temp 98.1 °F (36.7 °C) (Oral)   Resp 18   SpO2 94%       General appearance: No apparent distress, well developed, appears stated age. Eyes:  Pupils equal, round, and reactive to light. Conjunctivae/corneas clear. HENT: Head normal in appearance. External nares normal.  Oral mucosa moist without lesions. Hearing grossly intact. Neck: Supple, with full range of motion. Trachea midline. No gross JVD appreciated. Respiratory:  Normal respiratory effort. Clear to auscultation, bilaterally without rales or wheezes or rhonchi. Cardiovascular: Normal rate, regular rhythm with normal S1/S2 without murmurs. No lower extremity edema. Abdomen: Soft, non-tender, non-distended with normal bowel sounds. Musculoskeletal: There is no joint swelling or tenderness. Normal tone. No abnormal movements. Skin: Warm and dry. No rashes or lesions. Neurologic:  No focal sensory/motor deficits in the upper and lower extremities. Cranial nerves:  grossly non-focal 2-12.  5 out of 5 muscle strength all extremities. Intact sensation all extremities. No pronator drift. Negative Babinski sign. Alert oriented x3. Normal rapid alternating movements. Balance not tested. Psychiatric: Alert and oriented, normal insight and thought content. Capillary Refill: Brisk,< 3 seconds. Peripheral Pulses: +2 palpable, equal bilaterally. Labs:     Recent Labs     10/02/21  2104   WBC 5.6   HGB 15.1   HCT 43.9        Recent Labs     10/02/21  2104      K 3.9   CL 98   CO2 22*   BUN 9   CREATININE 0.6   CALCIUM 9.2     No results for input(s): AST, ALT, BILIDIR, BILITOT, ALKPHOS in the last 72 hours. No results for input(s): INR in the last 72 hours. No results for input(s): [facility] in the last 72 hours.   No results found for: Maximus Stephens, [address] [facility] [address], [address] [address], [facility] [address]      Radiology:     CT HEAD WO CONTRAST   Final Result   No acute intracranial findings. Nonemergent/incidental findings in the report. This document has been electronically signed by: Radha Moss MD on    10/03/2021 12:22 AM      All CTs at this facility use dose modulation techniques and iterative    reconstructions, and/or weight-based dosing   when appropriate to reduce radiation to a low as reasonably achievable. CTA HEAD W WO CONTRAST   Final Result   No intracranial large artery occlusion or hemodynamically significant    stenosis. This document has been electronically signed by: Radha Moss MD on    10/03/2021 01:23 AM      All CTs at this facility use dose modulation techniques and iterative    reconstructions, and/or weight-based dosing   when appropriate to reduce radiation to a low as reasonably achievable. CTA NECK W WO CONTRAST   Final Result   No occlusion in the carotid or vertebral arteries. No dissection. Moderate stenosis of the V1 segment of the left vertebral artery. This document has been electronically signed by: Radha Moss MD on    10/03/2021 01:26 AM      All CTs at this facility use dose modulation techniques and iterative    reconstructions, and/or weight-based dosing   when appropriate to reduce radiation to a low as reasonably achievable. Carotid stenosis and measurements are in accordance with NASCET criteria. PT/OT Eval Status:  will be assessed  Diet: No diet orders on file  DVT prophylaxis: Subcu Lovenox  Code Status: Prior  Disposition: admit to [facility] inpatient    Thank you Neeru Mahmood MD for the opportunity to be involved in this patient's care.     Electronically signed by Radha Montes De Oca MD on 10/3/2021 at 5:03 AM.

## 2021-10-03 NOTE — ED NOTES
ED nurse-to-nurse bedside report    Chief Complaint   Patient presents with    Blurred Vision      LOC: alert and orientated to name, place, date  Vital signs   Vitals:    10/03/21 0713 10/03/21 0821 10/03/21 0917 10/03/21 1032   BP: 130/71 133/75 (!) 144/86 138/79   Pulse: 65 65 77 86   Resp: 18 16 16 16   Temp:       TempSrc:       SpO2: 95% 95% 96% 96%   Weight:       Height:          Pain:    Pain Interventions: pt denies pain at this time  Pain Goal: 0  Oxygen: No    Current needs required Ra   Telemetry: Yes  LDAs:   Peripheral IV 10/02/21 Right Hand (Active)   Site Assessment Clean;Dry; Intact 10/03/21 1033   Line Status Flushed 10/03/21 1033   Dressing Status Clean;Dry; Intact 10/03/21 1033   Dressing Intervention New 10/02/21 2048       Peripheral IV 10/02/21 Right Upper arm (Active)   Site Assessment Clean;Dry; Intact 10/03/21 1033   Line Status Infusing 10/03/21 1033   Dressing Status Clean;Dry; Intact 10/03/21 1033   Dressing Intervention New 10/02/21 2336     Continuous Infusions:    sodium chloride      dextrose      sodium chloride 75 mL/hr at 10/03/21 0544     Mobility: Independent  Savage Fall Risk Score: No flowsheet data found.   Fall Interventions: side rails up x2, call light in place  Report given to: Abigail Almaguer RN  10/03/21 9219

## 2021-10-04 VITALS
BODY MASS INDEX: 23.05 KG/M2 | RESPIRATION RATE: 20 BRPM | OXYGEN SATURATION: 98 % | SYSTOLIC BLOOD PRESSURE: 134 MMHG | HEIGHT: 72 IN | WEIGHT: 170.2 LBS | HEART RATE: 100 BPM | DIASTOLIC BLOOD PRESSURE: 78 MMHG | TEMPERATURE: 98.1 F

## 2021-10-04 LAB
GLUCOSE BLD-MCNC: 145 MG/DL (ref 70–108)
GLUCOSE BLD-MCNC: 147 MG/DL (ref 70–108)
LV EF: 55 %
LVEF MODALITY: NORMAL

## 2021-10-04 PROCEDURE — 99239 HOSP IP/OBS DSCHRG MGMT >30: CPT | Performed by: INTERNAL MEDICINE

## 2021-10-04 PROCEDURE — 97530 THERAPEUTIC ACTIVITIES: CPT

## 2021-10-04 PROCEDURE — G0378 HOSPITAL OBSERVATION PER HR: HCPCS

## 2021-10-04 PROCEDURE — 82948 REAGENT STRIP/BLOOD GLUCOSE: CPT

## 2021-10-04 PROCEDURE — 6370000000 HC RX 637 (ALT 250 FOR IP): Performed by: PEDIATRICS

## 2021-10-04 PROCEDURE — 97165 OT EVAL LOW COMPLEX 30 MIN: CPT

## 2021-10-04 PROCEDURE — 6360000002 HC RX W HCPCS: Performed by: PEDIATRICS

## 2021-10-04 PROCEDURE — 96372 THER/PROPH/DIAG INJ SC/IM: CPT

## 2021-10-04 PROCEDURE — 93306 TTE W/DOPPLER COMPLETE: CPT

## 2021-10-04 PROCEDURE — 2580000003 HC RX 258: Performed by: PEDIATRICS

## 2021-10-04 RX ORDER — ATORVASTATIN CALCIUM 80 MG/1
80 TABLET, FILM COATED ORAL NIGHTLY
Qty: 30 TABLET | Refills: 3 | Status: CANCELLED | OUTPATIENT
Start: 2021-10-04

## 2021-10-04 RX ADMIN — SODIUM CHLORIDE, PRESERVATIVE FREE 10 ML: 5 INJECTION INTRAVENOUS at 08:35

## 2021-10-04 RX ADMIN — Medication 500 MCG: at 08:35

## 2021-10-04 RX ADMIN — INSULIN LISPRO 1 UNITS: 100 INJECTION, SOLUTION INTRAVENOUS; SUBCUTANEOUS at 13:02

## 2021-10-04 RX ADMIN — INSULIN LISPRO 1 UNITS: 100 INJECTION, SOLUTION INTRAVENOUS; SUBCUTANEOUS at 08:41

## 2021-10-04 RX ADMIN — AMLODIPINE BESYLATE 10 MG: 10 TABLET ORAL at 08:38

## 2021-10-04 RX ADMIN — ASPIRIN 325 MG: 325 TABLET, COATED ORAL at 08:35

## 2021-10-04 RX ADMIN — ENOXAPARIN SODIUM 40 MG: 40 INJECTION SUBCUTANEOUS at 08:35

## 2021-10-04 ASSESSMENT — PAIN SCALES - GENERAL: PAINLEVEL_OUTOF10: 0

## 2021-10-04 NOTE — PROGRESS NOTES
CLINICAL PHARMACY: DISCHARGE MED RECONCILIATION/REVIEW    [facility] ([facility]) Select Patient?: No  Total # of Interventions Recommended: 0   -   Total # Interventions Accepted: 0  Intervention Severity:   - Level 1 Intervention Present?: No   - Level 2 #: 0   - Level 3 #: 0   Time Spent (min): 5    Additional Documentation:    Megan CravenD, BCPS   10/4/2021  2:48 PM

## 2021-10-04 NOTE — CARE COORDINATION
10/4/21, 11:06 AM EDT  DISCHARGE PLANNING EVALUATION:    Shira Milner       Admitted: 10/2/2021/ 2026   Hospital day: 1   Location: [facility]01/001-A Reason for admit: Diplopia [H53.2]  Acute CVA (cerebrovascular accident) ([facility] [address] [I63.9]   PMH:  has a past medical history of Diabetes mellitus ([facility] [address] History of blood transfusion, and Hypertension. Barriers to Discharge:  Merissa Belcher: Neurology signed off  PCP: Cristy Davidson MD  Readmission Risk Score: 9%    Patient Goals/Plan/Treatment Preferences: denied needs as plans home w spouse independently as PTA  Transportation/Food Security/Housekeeping Addressed:  No issues identified. 10/4/21, 11:07 AM EDT    Patient goals/plan/ treatment preferences discussed by  and . Patient goals/plan/ treatment preferences reviewed with patient/ family. Patient/ family verbalize understanding of discharge plan and are in agreement with goal/plan/treatment preferences. Understanding was demonstrated using the teach back method. AVS provided by RN at time of discharge, which includes all necessary medical information pertaining to the patients current course of illness, treatment, post-discharge goals of care, and treatment preferences.         IMM Letter  IMM Letter date given[de-identified] 10/03/21

## 2021-10-04 NOTE — DISCHARGE SUMMARY
objects far away accompanied by an imbalance or ataxia having to use furniture to hold himself from falling. He continued to have nausea and was unable to eat anything or keep anything down including his medications. The following day, which is today 10/2/2021, he decided to come in for a checkup. He denies having any symptoms of numbness or tingling or focal weakness in any of his limbs. No slurred speech, drooling, or aphasia or difficulty finding words or talking. No asymmetrical facial droop or smile. No prior history of strokes. He does have multiple risk factors including diabetes, hypertension, and hyperlipidemia. Denies any history of hypoglycemia as well. He states that he has not had any GI symptoms of diarrhea or any viral syndrome prior to the onset of his symptoms.     Upon arrival to the emergency room patient's blood pressure was 156/81 with a pulse of 95 and respiratory of 19 and he was afebrile. His laboratory investigations were unremarkable CT scan of the head without contrast did not show any acute intracranial process. CT angiogram of the head and neck did show moderate narrowing of the V1 section of the left vertebral artery. Patient was given 1 L of IV fluid normal saline and 4 mg of IV Zofran. Neurology service was contacted from the emergency room and recommended admission for an MRI. Patient was then admitted to our hospitalist service for further evaluation and treatment.     At the time of my evaluation in the emergency room, the patient was laying in bed resting comfortably no acute distress. He continued to have diplopia when looking at objects from a distance. He says that his ataxia has been present while in the emergency room. Otherwise the rest of his neurological exam was normal.\"    Patient had an MRI which reports no acute ischemic event; mild severity chronic microvascular angiopathy, and small chronic lacunar infarcts in the right basal ganglia.  Patient was seen by Neurology and recommends vestibular rehabilitation therapy, which patient was referred to for outpatient. He was evaluated by PT/OT prior to discharge and did well. His symptoms has significantly improved. Patient is discharged in stable condition. Recommended patient get a repeat eye exam from his ophthalmologist and recommend seeing ENT as his dizziness may be related to his inner ear. Exam:     Vitals:  Vitals:    10/03/21 1935 10/03/21 2350 10/04/21 0317 10/04/21 0815   BP: 123/73 132/74 128/62 134/77   Pulse: 82 64 65 82   Resp: 16 16 16 18   Temp: 98.5 °F (36.9 °C) 98.5 °F (36.9 °C) 98.4 °F (36.9 °C) 97.6 °F (36.4 °C)   TempSrc: Oral Oral Oral Oral   SpO2: 95% 96% 93% 98%   Weight:   170 lb 3.2 oz (77.2 kg)    Height:         Weight: Weight: 170 lb 3.2 oz (77.2 kg)     24 hour intake/output:    Intake/Output Summary (Last 24 hours) at 10/4/2021 1018  Last data filed at 10/4/2021 5841  Gross per 24 hour   Intake 616.17 ml   Output --   Net 616.17 ml         General appearance:  No apparent distress, appears stated age and cooperative. HEENT:  Normal cephalic, atraumatic without obvious deformity. Pupils equal, round, and reactive to light. Extra ocular muscles intact. Conjunctivae/corneas clear. Neck: Supple, with full range of motion. No jugular venous distention. Trachea midline. Respiratory:  Normal respiratory effort. Clear to auscultation, bilaterally without Rales/Wheezes/Rhonchi. Cardiovascular:  Regular rate and rhythm with normal S1/S2 without murmurs, rubs or gallops. Abdomen: Soft, non-tender, non-distended with normal bowel sounds. Musculoskeletal:  No clubbing, cyanosis or edema bilaterally. Full range of motion without deformity. Skin: Skin color, texture, turgor normal.  No rashes or lesions. Neurologic:  Neurovascularly intact without any focal sensory/motor deficits.  Cranial nerves: II-XII intact, grossly non-focal.  Psychiatric:  Alert and oriented, thought content appropriate, normal insight  Capillary Refill: Brisk,< 3 seconds   Peripheral Pulses: +2 palpable, equal bilaterally       Labs: For convenience and continuity at follow-up the following most recent labs are provided:      CBC:    Lab Results   Component Value Date    WBC 5.6 10/02/2021    HGB 15.1 10/02/2021    HCT 43.9 10/02/2021     10/02/2021       Renal:    Lab Results   Component Value Date     10/02/2021    K 3.9 10/02/2021    CL 98 10/02/2021    CO2 22 10/02/2021    BUN 9 10/02/2021    CREATININE 0.6 10/02/2021    CALCIUM 9.2 10/02/2021         Significant Diagnostic Studies    Radiology:   MRI BRAIN W WO CONTRAST   Final Result       1. No evidence of acute intracranial abnormality. 2. Mild severity chronic microvascular angiopathy. 3. Small chronic lacunar infarct in the right basal ganglia. **This report has been created using voice recognition software. It may contain minor errors which are inherent in voice recognition technology. **         Final report electronically signed by Dr. Renee Wu MD on 10/3/2021 11:40 AM      CT HEAD WO CONTRAST   Final Result   No acute intracranial findings. Nonemergent/incidental findings in the report. This document has been electronically signed by: Jeni Perez MD on    10/03/2021 12:22 AM      All CTs at this facility use dose modulation techniques and iterative    reconstructions, and/or weight-based dosing   when appropriate to reduce radiation to a low as reasonably achievable. CTA HEAD W WO CONTRAST   Final Result   No intracranial large artery occlusion or hemodynamically significant    stenosis. This document has been electronically signed by: Jeni Perez MD on    10/03/2021 01:23 AM      All CTs at this facility use dose modulation techniques and iterative    reconstructions, and/or weight-based dosing   when appropriate to reduce radiation to a low as reasonably achievable.       CTA NECK W WO CONTRAST Final Result   No occlusion in the carotid or vertebral arteries. No dissection. Moderate stenosis of the V1 segment of the left vertebral artery. This document has been electronically signed by: Inder Patel MD on    10/03/2021 01:26 AM      All CTs at this facility use dose modulation techniques and iterative    reconstructions, and/or weight-based dosing   when appropriate to reduce radiation to a low as reasonably achievable. Carotid stenosis and measurements are in accordance with NASCET criteria. Consults:     IP CONSULT TO NEUROLOGY    Disposition: Home  Condition at Discharge: Stable    Code Status:  Full Code     Patient Instructions:    Discharge lab work: None  Activity: activity as tolerated  Diet: ADULT DIET;  Dysphagia - Soft and Bite Sized; 4 carb choices (60 gm/meal)      Follow-up visits:   Irma Morris MD  [facility] [place]  [phone]    On 10/11/2021  FOLLOW UP APPT @ 11:30AM, arrive 15 minutes early, please bring photo ID and medications     Discharge Medications:      Vishal Milner Medication Instructions MSI:[id number]    Printed on:10/04/21 1018   Medication Information                      amLODIPine-olmesartan (DANIEL) 10-20 MG per tablet  Take 1 tablet by mouth daily             aspirin 81 MG tablet  Take 81 mg by mouth daily             Cholecalciferol (D3) 50 MCG (2000 UT) TABS  Take 1 tablet by mouth daily             Cinnamon 500 MG CAPS  Take 2 capsules by mouth daily              Multiple Vitamins-Minerals (ICAPS AREDS 2 PO)  Take 1 capsule by mouth daily             Multiple Vitamins-Minerals (THERAPEUTIC MULTIVITAMIN-MINERALS) tablet  Take 1 tablet by mouth daily             ondansetron (ZOFRAN-ODT) 4 MG disintegrating tablet  Take 4 mg by mouth every 6 hours as needed for Nausea or Vomiting             pioglitazone-metformin (ACTOPLUS MET)  MG per tablet  Take 1 tablet by mouth 2 times daily (with meals)               Time Spent on discharge is more than 35 minutes in the examination, evaluation, counseling and review of medications and discharge plan. Signed: Thank you Neeru Mahmood MD for the opportunity to be involved in this patient's care.     Electronically signed by Yissel Amor DO on 10/4/2021 at 10:18 AM

## 2021-10-04 NOTE — PROGRESS NOTES
[facility] STEPDOWN TELEMETRY 4K  EVALUATION    Time:   Time In: 7253  Time Out: 6878  Timed Code Treatment Minutes: 14 Minutes  Minutes: 26          Date: 10/4/2021  Patient Name: Regan Miner,   Gender: male      MRN: [id number]  : 1955  (77 y.o.)  Referring Practitioner: Kenroy Rondon MD  Diagnosis: diploplia  Additional Pertinent Hx: Per H&P:presents to the emergency room here at [facility] on 10/2/2021 complaining of about a 36-hour history of new onset diplopia which occurred after he ate breakfast on Friday, 10/1/2021 accompanied by an imbalance in his gait. Patient reported that he was in his normal state of health with no signs or symptoms of any type of infection or viral illness when he suddenly started having double vision of objects far away accompanied by an imbalance or ataxia having to use furniture to hold himself from falling. He continued to have nausea and was unable to eat anything or keep anything down including his medications. The following day, which is today 10/2/2021, he decided to come in for a checkup. CT and MRI of head negative for acute change. Restrictions/Precautions:  Restrictions/Precautions: General Precautions, Fall Risk    Subjective  Chart Reviewed: Yes, Orders, Progress Notes, History and Physical, Imaging  Patient assessed for rehabilitation services?: Yes  Family / Caregiver Present: Yes    Subjective: Pt seated in bed upon arrival, agreeable to OT session. Pt still c/o mild diploplia, although reports has significantly improved since admission. Pt reports everything else seems back to baseline.     Pain:  Pain Assessment  Patient Currently in Pain: Denies    Vitals: Vitals not assessed per clinical judgement, see nursing flowsheet    Social/Functional History:  Lives With: Spouse (wife is not home most of the time as she stays with their son who is still recovering from recent hospitalization/surgeries)  Type of Home: House  Home Layout: Two level (bedroom upstairs; bathroom on main floor)  Home Equipment:  (none)   Bathroom Shower/Tub: Tub/Shower unit  Bathroom Toilet: Standard  Bathroom Equipment:  (none)       ADL Assistance: Independent  Homemaking Assistance: Independent  Ambulation Assistance: Independent  Transfer Assistance: Independent    Active : Yes  Occupation: Retired       VISION:Pt wears glasses all the time and currently had on. With visual scanning in all quadrants, pt c/o diploplia with left eye only, and only in peripheral vision. Pt reported \"it feels like your finger is stacked on top of the other\" when in L peripheral vision while R eye occluded. Pt able to appropriately read items, navigate throughout environment without difficulty. Pt reported vision has significantly improved since admission    HEARING:  WFL    COGNITION: WFL    RANGE OF MOTION:  Bilateral Upper Extremity:  WFL    STRENGTH:  Bilateral Upper Extremity:  WFL    ADL:   Lower Extremity Dressing: Independent. donning socks. BALANCE:  Sitting Balance:  Independent. Standing Balance: Independent. BED MOBILITY:  Supine to Sit: Modified Independent    Scooting: Independent      TRANSFERS:  Sit to Stand:  Independent. Stand to Sit: Independent. FUNCTIONAL MOBILITY:  Assistive Device: None  Assist Level: Independent. Distance: in room, >household distance in hallway  Steady, no LOB or difficulty navigating. Pt able to ascend/descend ~5 steps (limited by IV) with use of unilateral handrail with MI-supervision. Steady without LOB. Pt declined any dizziness/feeling of room spinning throughout, able to turn head to R/L, up/down without onset of dizziness. No c/o dizziness with functional change in position.  Pt reported would occasionally have dizziness at home with change in position, although reports has learned to take his time when changing positions and dizziness has subsided then. Encouraged avoiding extreme changes in position (bending towards floor to retrieve item then immediately becoming upright) and reviewed modifications for tasks as needed. Pt verbalized understanding of all. Activity Tolerance:  Patient tolerance of  treatment: good. Assessment  Pt currently at baseline for ADLs, mobility, and transfers and safe to return home when medically stable. No further OT services warranted at this time. Pt declined any further needs for therapy at this time, feels comfortable returning home when medically stable. Prognosis: Good  REQUIRES OT FOLLOW UP: No  No Skilled OT: Independent with functional mobility, Independent with ADL's, At baseline function, Safe to return home, No OT goals identified  Decision Making: Low Complexity    Treatment Initiated: Treatment and education initiated within context of evaluation. Evaluation time included review of current medical information, gathering information related to past medical, social and functional history, completion of standardized testing, formal and informal observation of tasks, assessment of data and development of plan of care and goals. Treatment time included skilled education and facilitation of tasks to increase safety and independence with ADL's for improved functional independence and quality of life. Discharge Recommendations:  Home with assist PRN, Defer OT at this time    Patient Education:  OT Education: OT Role, Plan of Care (not returning to driving until cleared by neurology/diploplia resolves)    Equipment Recommendations:  Equipment Needed: No    Plan:  Times per week: N/A. Willow Lozano Following session, patient left in safe position with all fall risk precautions in place.

## 2021-10-04 NOTE — PROGRESS NOTES
Pharmacy Medication History Note      List of current medications patient is taking is complete. Source of information: Patient, [employer]    Changes made to medication list:    Other notes (ex. Recent course of antibiotics, Coumadin dosing): Ondansetron 4 mg ODT - patient has medication at home, but uses infrequently. Used prior to admission.       Allergies reviewed: NKDA      Electronically signed by Troy Elam on 10/4/2021 at 1:15 PM

## 2021-10-04 NOTE — PROGRESS NOTES
[facility]  PHYSICAL THERAPY MISSED TREATMENT NOTE  [facility] ICU STEPDOWN TELEMETRY 4K    Date: 10/4/2021  Patient Name: Ino Xiao        MRN: [id number]   : 1955  (77 y.o.)  Gender: male                REASON FOR MISSED TREATMENT:  Per communication with OT, Marly Ramsey, pt is IND with mobility tasks at this time. Upon arrival, pt amb in halls, completed steps withouit difficulty, pt voice no questions or concerns at this time. Will d/c therapy order.  Diego Cannon, PT, DPT

## 2021-10-04 NOTE — PLAN OF CARE
Problem: Pain Control  Goal: Maintain pain level at or below patient's acceptable level (or 5 if patient is unable to determine acceptable level)  Outcome: Completed     Problem: Neurological  Goal: Maximum potential motor/sensory/cognitive function  Outcome: Completed     Problem: Cardiovascular  Goal: Hemodynamic stability  Outcome: Completed     Problem: Discharge Planning  Goal: Knowledge of discharge instructions  Description: Knowledge of discharge instructions  Outcome: Completed     Care plan reviewed with patient. Patient verbalizes understanding of the plan of care and contributes to goal setting.

## 2021-10-05 LAB — MRSA SCREEN: NORMAL

## (undated) DEVICE — CATHETER ETER IV 22GA L1IN POLYUR STR RADPQ INTROCAN SFTY

## (undated) DEVICE — CONNECTOR TBNG AUX H2O JET DISP FOR OLY 160/180 SER

## (undated) DEVICE — ENDO KIT: Brand: MEDLINE INDUSTRIES, INC.

## (undated) DEVICE — LINER SUCT CANSTR 1500CC SEMI RIG W/ POR HYDROPHOBIC SHUT

## (undated) DEVICE — IV START KIT: Brand: MEDLINE INDUSTRIES, INC.

## (undated) DEVICE — TUBING IV STOPCOCK 48 CM 3 W

## (undated) DEVICE — SET ADMIN 25ML L117IN PMP MOD CK VLV RLER CLMP 2 SMRTSITE

## (undated) DEVICE — SET LNR RED GRN W/ BASE CLEANASCOPE

## (undated) DEVICE — SOLUTION IV 1000ML 0.45% SOD CHL PH 5 INJ USP VIAFLX PLAS